# Patient Record
Sex: MALE | Race: BLACK OR AFRICAN AMERICAN | NOT HISPANIC OR LATINO | URBAN - METROPOLITAN AREA
[De-identification: names, ages, dates, MRNs, and addresses within clinical notes are randomized per-mention and may not be internally consistent; named-entity substitution may affect disease eponyms.]

---

## 2018-08-09 ENCOUNTER — TRANSFERRED RECORDS (OUTPATIENT)
Dept: HEALTH INFORMATION MANAGEMENT | Facility: CLINIC | Age: 28
End: 2018-08-09

## 2018-11-21 NOTE — TELEPHONE ENCOUNTER
FUTURE VISIT INFORMATION      FUTURE VISIT INFORMATION:    Date: 11/27/18    Time: 1500    Location: [facility]  REFERRAL INFORMATION:    Referring provider:  DR GRIDER     Referring providers clinic:  [facility]    Reason for visit/diagnosis  FEMUR LESION     RECORDS REQUESTED FROM:       Clinic name Comments Records Status Imaging Status                                         RECORDS RECEIVED FROM: [facility]   DATE RECEIVED: 11/21/18   NOTES STATUS DETAILS   OFFICE NOTE from referring provider Care Everywhere 8/16/18*8/23/18*   OFFICE NOTE from other specialist N/A    DISCHARGE SUMMARY from hospital N/A    DISCHARGE REPORT from the ER Care Everywhere 8/9/18   OPERATIVE REPORT N/A    MEDICATION LIST Care Everywhere    IMPLANT RECORD/STICKER N/A    LABS     CBC/DIFF Care Every[facility]    CULTURES N/A    INJECTIONS DONE IN RADIOLOGY N/A    MRI RECEIVED 8/9/18*   CT SCAN N/A    XRAYS (IMAGES & REPORTS) RECEIVED 8/9/18*   TUMOR     PATHOLOGY  Slides & report N/A

## 2018-11-27 ENCOUNTER — PRE VISIT (OUTPATIENT)
Dept: ORTHOPEDICS | Facility: CLINIC | Age: 28
End: 2018-11-27

## 2018-11-27 ENCOUNTER — OFFICE VISIT (OUTPATIENT)
Dept: ORTHOPEDICS | Facility: CLINIC | Age: 28
End: 2018-11-27
Payer: COMMERCIAL

## 2018-11-27 VITALS — WEIGHT: 264 LBS | BODY MASS INDEX: 40.01 KG/M2 | HEIGHT: 68 IN

## 2018-11-27 DIAGNOSIS — M85.60 BONE CYST: Primary | ICD-10-CM

## 2018-11-27 NOTE — PROGRESS NOTES
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  History is obtained from the patient and his father who was present throughout the visit.  They report atraumatic onset of left hip pain in August, 2018.  He notes that he was having progressively increasing pain throughout the day while he was at work as a  and then subsequently when he went to the [employer] that evening.  He denies any specific incident such as jumping or twisting or dancing that caused the onset of his pain.  He does note by the end of the night and the following morning he progressively had difficulty with ambulating and eventually was unable to walk on that leg without significant pain.  He presented to a local orthopedic practice who obtained x-rays and subsequently an MRI.  He was told to be nonweightbearing and rest the leg for at least 1 week associated with anti-inflammatories and ice.  He notes that his symptoms completely resolved within that week and he has since returned to his prior activities without difficulty.  He has been working as a  which includes prolonged standing without difficulty.  He denies any pain to the hips prior.  He denies any right hip pain.  He denies any numbness or tingling, motor dysfunction or weakness, fevers or chills, unintentional weight loss or gain.  He and his father deny any concerns surrounding healing or infection from his previous surgeries.  As previously noted, when he was 8 years old he underwent bilateral proximal femur osteotomy with internal fixation and subsequent hardware removal for Blcu-Ukcbu-Addlwth disease.     Past medical history:  History of Yrpm-Etcrm-Trgozah disease    Past  "surgical history:  1.  Bilateral proximal femur osteotomy with internal fixation  2.  Bilateral proximal femur hardware removal    Allergies: Penicillin    Medications: None    Family history: Patient denies family history of problems of bleeding, clotting or anesthesia.  Denies family history of bone or soft tissue tumors.    Social history: The patient lives in [place] in an apartment alone.  He is a .  He is a non-smoker.  He has 2-3 alcoholic beverages 5 days a week.  He denies illicit drug use.    Review of systems: Intake form was reviewed and the answers are included at the end of this dictation.    Physical exam:  Ht 1.727 m (5' 8\")  Wt 119.7 kg (264 lb)  BMI 40.14 kg/m2  General: No acute distress, pleasant, cooperative with exam.  HEENT: Normocephalic atraumatic.  Cardiac: Extremities warm well perfused.  Respiratory: Nonlabored breathing on room air.  Neuro: Moves all extremities spontaneously.   Psych: Appropriate affect.  Skin: No rashes or wounds noted on exposed skin.  MSK: Focused examination of the left lower extremity reveals DP and PT pulses 2+.  Sensation intact light touch in the superficial peroneal, deep peroneal, saphenous, sural and tibial nerve just patients.  Patient fires EHL, FHL, TA, GSC with 5 out of 5 strength.  He has a nonantalgic gait.  He is able to gain the examination table without difficulty.  He is nontender palpation of the greater trochanter proximal thigh.  No soft tissue masses appreciated.  He has full and symmetric hip range of motion without pain including flexion to 100 degrees and internal and external rotation without discomfort.  He is able to perform straight leg raise.    Imaging:    An x-ray of the pelvis obtained on August 9, 2018 is reviewed and reveals evidence of prior osteotomy with subsequent fixation including bony overgrowth likely related to his prior hardware.  There is evidence of a lytic lesion involving the femoral neck and " intertrochanteric region of bilateral proximal femurs.  Otherwise no acute bony abnormalities appreciated.    An MRI of the left hip obtained on August 9, 2018 is reviewed and reveals evidence of a cystic lesion within the femoral neck and intertrochanteric region consistent postsurgical changes from his prior osteotomy and internal fixation.  There is an anterior 2.5 mm cortical defect associated with this lesion that does not appear to be a complete fracture and involves the femoral neck.    Assessment and plan: The patient is a 28-year-old male with past medical history including Duxl-Ogomln-Bdkfack disease s/p bilateral proximal femur osteotomy and subsequent hardware removal resenting for evaluation of bilateral proximal femur lesions consistent with cystic lesions consistent with sequela of prior surgery.  We reviewed the clinical exam findings and imaging with the patient and his father in clinic today.  We discussed the likely etiology.  We also discussed the role for prophylactic fixation.  Given his symptoms have completely resolved, and he has returned to activity without restriction, do not feel prophylactic fixation is necessary.  We did discuss that should he be returning to activities or high impact activities with an onset of pain, then he should represent to our clinic for further evaluation.  We also discussed that prior to any consideration of prophylactic fixation, we would want to repeat the MRI to confirm that this cortical lesion persisted and was not purely artifact from the time of the initial MRI.  All questions were answered today.    The patient was seen and discussed with Dr. Cruz who is in agreement the above assessment and plan.    Dede Mendoza MD  Orthopedic Surgery Resident, PGY-4  Answers for HPI/ROS submitted by the patient on 11/27/2018   General Symptoms: No  Skin Symptoms: No  HENT Symptoms: No  EYE SYMPTOMS: No  HEART SYMPTOMS: No  LUNG SYMPTOMS: No  INTESTINAL SYMPTOMS:  No  URINARY SYMPTOMS: No  REPRODUCTIVE SYMPTOMS: No  SKELETAL SYMPTOMS: No  BLOOD SYMPTOMS: No  NERVOUS SYSTEM SYMPTOMS: No  MENTAL HEALTH SYMPTOMS: No

## 2018-11-27 NOTE — NURSING NOTE
"Chief Complaint   Patient presents with     Consult     Pt. states that he is here today for Possibility of Fluid/Lesions of Bilat. Femurs. Pt. states that originally he began to exp. pain in Bilat. Hips that started in August. He states that his Pain was so bad that he couldn't walk or WB at the end of the evening. He states that he was told to not WB, and was prescribed Pain medication along with an anti-inflam. Pt. states that his pain subsided almost instantly. He had his scans done and that's when he was also referred to us. Ref.:  FATIMAH OCASIO AUSTYN       28 year old  1990    Ht 1.727 m (5' 8\")  Wt 119.7 kg (264 lb)  BMI 40.14 kg/m2           [facility] - [place], [place] - [address] [place] [address] AT [address] [facility] [place], [address]    Allergies   Allergen Reactions     Penicillins Anaphylaxis     No current outpatient prescriptions on file.     No current facility-administered medications for this visit.                  "

## 2018-11-27 NOTE — LETTER
11/27/2018       RE: Favio Clinton  [address]  [address]  [place] [facility] [place] [address]     Dear Colleague,    Thank you for referring your patient, Favio Clinton, to the [facility]. Please see a copy of my visit note below.    Dear Barry,    Thank you for asked me to see Favio for evaluation of a single incidence of acute left hip pain in August 2018.  As you know your evaluation is diagnosed what appear to be bilateral proximal femoral cyst with some concern of breakthrough of the anterior cortex of the left femoral neck.  The area of potential breakthrough is approximately 2 mm.    I reviewed with Michael and his lifestyle and his aspirations as far as physical activity goal.  He has been asymptomatic for over 3 months.  I explored with him and his family member the option of observation with the reporting of new symptoms versus surgical prophylactic stabilization.  They favor the former.  I agree with this.  He will contact us if he develops new symptoms.    He was seen today with Dr. Mendoza.  I agree with her assessment and plan.    Sincerely,    Referring provider: Barry Ghosh MD    Chief complaint: Left hip pain    History of present illness: The patient is a 28-year-old male with past medical history including Tyxe-Wphhl-Jngroux disease status post osteotomy and internal fixation with subsequent hardware removal presenting for evaluation of now resolved left hip pain.  History is obtained from the patient and his father who was present throughout the visit.  They report atraumatic onset of left hip pain in August, 2018.  He notes that he was having progressively increasing pain throughout the day while he was at work as a  and then subsequently when he went to the [employer] that evening.  He denies any specific incident such as jumping or twisting or dancing that caused the onset of his pain.  He does note by the end of the  "night and the following morning he progressively had difficulty with ambulating and eventually was unable to walk on that leg without significant pain.  He presented to a local orthopedic practice who obtained x-rays and subsequently an MRI.  He was told to be nonweightbearing and rest the leg for at least 1 week associated with anti-inflammatories and ice.  He notes that his symptoms completely resolved within that week and he has since returned to his prior activities without difficulty.  He has been working as a  which includes prolonged standing without difficulty.  He denies any pain to the hips prior.  He denies any right hip pain.  He denies any numbness or tingling, motor dysfunction or weakness, fevers or chills, unintentional weight loss or gain.  He and his father deny any concerns surrounding healing or infection from his previous surgeries.  As previously noted, when he was 8 years old he underwent bilateral proximal femur osteotomy with internal fixation and subsequent hardware removal for Ppic-Paptc-Bnfcdwl disease.     Past medical history:  History of Nayq-Pzict-Hgfvzvz disease    Past surgical history:  1.  Bilateral proximal femur osteotomy with internal fixation  2.  Bilateral proximal femur hardware removal    Allergies: Penicillin    Medications: None    Family history: Patient denies family history of problems of bleeding, clotting or anesthesia.  Denies family history of bone or soft tissue tumors.    Social history: The patient lives in [place] in an apartment alone.  He is a .  He is a non-smoker.  He has 2-3 alcoholic beverages 5 days a week.  He denies illicit drug use.    Review of systems: Intake form was reviewed and the answers are included at the end of this dictation.    Physical exam:  Ht 1.727 m (5' 8\")  Wt 119.7 kg (264 lb)  BMI 40.14 kg/m2  General: No acute distress, pleasant, cooperative with exam.  HEENT: Normocephalic atraumatic.  Cardiac: Extremities " warm well perfused.  Respiratory: Nonlabored breathing on room air.  Neuro: Moves all extremities spontaneously.   Psych: Appropriate affect.  Skin: No rashes or wounds noted on exposed skin.  MSK: Focused examination of the left lower extremity reveals DP and PT pulses 2+.  Sensation intact light touch in the superficial peroneal, deep peroneal, saphenous, sural and tibial nerve just patients.  Patient fires EHL, FHL, TA, GSC with 5 out of 5 strength.  He has a nonantalgic gait.  He is able to gain the examination table without difficulty.  He is nontender palpation of the greater trochanter proximal thigh.  No soft tissue masses appreciated.  He has full and symmetric hip range of motion without pain including flexion to 100 degrees and internal and external rotation without discomfort.  He is able to perform straight leg raise.    Imaging:    An x-ray of the pelvis obtained on August 9, 2018 is reviewed and reveals evidence of prior osteotomy with subsequent fixation including bony overgrowth likely related to his prior hardware.  There is evidence of a lytic lesion involving the femoral neck and intertrochanteric region of bilateral proximal femurs.  Otherwise no acute bony abnormalities appreciated.    An MRI of the left hip obtained on August 9, 2018 is reviewed and reveals evidence of a cystic lesion within the femoral neck and intertrochanteric region consistent postsurgical changes from his prior osteotomy and internal fixation.  There is an anterior 2.5 mm cortical defect associated with this lesion that does not appear to be a complete fracture and involves the femoral neck.    Assessment and plan: The patient is a 28-year-old male with past medical history including Kmsh-Lqummv-Odevoif disease s/p bilateral proximal femur osteotomy and subsequent hardware removal resenting for evaluation of bilateral proximal femur lesions consistent with cystic lesions consistent with sequela of prior surgery.  We  reviewed the clinical exam findings and imaging with the patient and his father in clinic today.  We discussed the likely etiology.  We also discussed the role for prophylactic fixation.  Given his symptoms have completely resolved, and he has returned to activity without restriction, do not feel prophylactic fixation is necessary.  We did discuss that should he be returning to activities or high impact activities with an onset of pain, then he should represent to our clinic for further evaluation.  We also discussed that prior to any consideration of prophylactic fixation, we would want to repeat the MRI to confirm that this cortical lesion persisted and was not purely artifact from the time of the initial MRI.  All questions were answered today.    The patient was seen and discussed with Dr. Cruz who is in agreement the above assessment and plan.    Dede Mendoza MD  Orthopedic Surgery Resident, PGY-4      Again, thank you for allowing me to participate in the care of your patient.      Sincerely,    Arthur Cruz MD

## 2018-11-27 NOTE — PROGRESS NOTES
Dear Barry,    Thank you for asked me to see Faivo for evaluation of a single incidence of acute left hip pain in August 2018.  As you know your evaluation is diagnosed what appear to be bilateral proximal femoral cyst with some concern of breakthrough of the anterior cortex of the left femoral neck.  The area of potential breakthrough is approximately 2 mm.    I reviewed with Michael and his lifestyle and his aspirations as far as physical activity goal.  He has been asymptomatic for over 3 months.  I explored with him and his family member the option of observation with the reporting of new symptoms versus surgical prophylactic stabilization.  They favor the former.  I agree with this.  He will contact us if he develops new symptoms.    He was seen today with Dr. Mendoza.  I agree with her assessment and plan.    Sincerely,

## 2018-11-27 NOTE — MR AVS SNAPSHOT
"              After Visit Summary   2018    Favio Clinton    MRN: [id number]           Patient Information     Date Of Birth          1990        Visit Information        Provider Department      2018 3:00 PM Arthur Cruz [facility]        Today's Diagnoses     Bone cyst    -  1       Follow-ups after your visit        Who to contact     Please call your clinic at [phone] to:    Ask questions about your health    Make or cancel appointments    Discuss your medicines    Learn about your test results    Speak to your doctor            Additional Information About Your Visit        MyChart Information     [employer] is an electronic gateway that provides easy, online access to your medical records. With [employer], you can request a clinic appointment, read your test results, renew a prescription or communicate with your care team.     To sign up for [employer] visit the website at [email or web address]   You will be asked to enter the access code listed below, as well as some personal information. Please follow the directions to create your username and password.     Your access code is: [email or web address]  Expires: 2019  6:30 AM     Your access code will  in 90 days. If you need help or a new code, please contact your [facility] or call [phone] for assistance.        Care EveryWhere ID     This is your Care EveryWhere ID. This could be used by other organizations to access your [facility] medical records  [id number]        Your Vitals Were     Height BMI (Body Mass Index)                1.727 m (5' 8\") 40.14 kg/m2           Blood Pressure from Last 3 Encounters:   No data found for BP    Weight from Last 3 Encounters:   18 119.7 kg (264 lb)              Today, you had the following     No orders found for display       Primary Care Provider Fax #    Physician No Ref-Primary [phone]       No address on file      "   Equal Access to Services     [facility] Center[facility]ANETTE : Hadii dali Garza, waleonardkennedy dickey, josafatchance ramirez. So [facility] [phone].    ATENCIÓN: Si habla español, tiene a kinsey disposición servicios gratuitos de asistencia lingüística. Llame al [phone].    We comply with applicable federal civil rights laws and [place] laws. We do not discriminate on the basis of race, color, national origin, age, disability, sex, sexual orientation, or gender identity.            Thank you!     Thank you for choosing [facility]  for your care. Our goal is always to provide you with excellent care. Hearing back from our patients is one way we can continue to improve our services. Please take a few minutes to complete the written survey that you may receive in the mail after your visit with us. Thank you!             Your Updated Medication List - Protect others around you: Learn how to safely use, store and throw away your medicines at www.disposemymeds.org.      Notice  As of 11/27/2018  4:12 PM    You have not been prescribed any medications.

## 2021-06-02 ENCOUNTER — RECORDS - HEALTHEAST (OUTPATIENT)
Dept: ADMINISTRATIVE | Facility: CLINIC | Age: 31
End: 2021-06-02